# Patient Record
Sex: MALE | Race: WHITE
[De-identification: names, ages, dates, MRNs, and addresses within clinical notes are randomized per-mention and may not be internally consistent; named-entity substitution may affect disease eponyms.]

---

## 2020-03-19 ENCOUNTER — HOSPITAL ENCOUNTER (EMERGENCY)
Dept: HOSPITAL 47 - EC | Age: 30
Discharge: HOME | End: 2020-03-19
Payer: COMMERCIAL

## 2020-03-19 VITALS — HEART RATE: 78 BPM | SYSTOLIC BLOOD PRESSURE: 138 MMHG | DIASTOLIC BLOOD PRESSURE: 76 MMHG | TEMPERATURE: 97.9 F

## 2020-03-19 VITALS — RESPIRATION RATE: 16 BRPM

## 2020-03-19 DIAGNOSIS — M71.162: Primary | ICD-10-CM

## 2020-03-19 DIAGNOSIS — F17.200: ICD-10-CM

## 2020-03-19 DIAGNOSIS — Z88.8: ICD-10-CM

## 2020-03-19 DIAGNOSIS — Z88.0: ICD-10-CM

## 2020-03-19 LAB
ALBUMIN SERPL-MCNC: 4.7 G/DL (ref 3.5–5)
ALP SERPL-CCNC: 90 U/L (ref 38–126)
ALT SERPL-CCNC: 47 U/L (ref 4–49)
ANION GAP SERPL CALC-SCNC: 7 MMOL/L
AST SERPL-CCNC: 31 U/L (ref 17–59)
BASOPHILS # BLD AUTO: 0 K/UL (ref 0–0.2)
BASOPHILS NFR BLD AUTO: 0 %
BUN SERPL-SCNC: 16 MG/DL (ref 9–20)
CALCIUM SPEC-MCNC: 10 MG/DL (ref 8.4–10.2)
CHLORIDE SERPL-SCNC: 105 MMOL/L (ref 98–107)
CO2 SERPL-SCNC: 27 MMOL/L (ref 22–30)
EOSINOPHIL # BLD AUTO: 0.3 K/UL (ref 0–0.7)
EOSINOPHIL NFR BLD AUTO: 2 %
ERYTHROCYTE [DISTWIDTH] IN BLOOD BY AUTOMATED COUNT: 4.17 M/UL (ref 4.3–5.9)
ERYTHROCYTE [DISTWIDTH] IN BLOOD: 12.7 % (ref 11.5–15.5)
GLUCOSE SERPL-MCNC: 97 MG/DL (ref 74–99)
HCT VFR BLD AUTO: 37.7 % (ref 39–53)
HGB BLD-MCNC: 12.3 GM/DL (ref 13–17.5)
LYMPHOCYTES # SPEC AUTO: 2.4 K/UL (ref 1–4.8)
LYMPHOCYTES NFR SPEC AUTO: 15 %
MCH RBC QN AUTO: 29.6 PG (ref 25–35)
MCHC RBC AUTO-ENTMCNC: 32.7 G/DL (ref 31–37)
MCV RBC AUTO: 90.6 FL (ref 80–100)
MONOCYTES # BLD AUTO: 0.6 K/UL (ref 0–1)
MONOCYTES NFR BLD AUTO: 4 %
NEUTROPHILS # BLD AUTO: 12.6 K/UL (ref 1.3–7.7)
NEUTROPHILS NFR BLD AUTO: 79 %
PLATELET # BLD AUTO: 311 K/UL (ref 150–450)
POTASSIUM SERPL-SCNC: 4.5 MMOL/L (ref 3.5–5.1)
PROT SERPL-MCNC: 7.4 G/DL (ref 6.3–8.2)
SODIUM SERPL-SCNC: 139 MMOL/L (ref 137–145)
URATE SERPL-MCNC: 9.2 MG/DL (ref 3.5–8.5)
WBC # BLD AUTO: 16 K/UL (ref 3.8–10.6)

## 2020-03-19 PROCEDURE — 85652 RBC SED RATE AUTOMATED: CPT

## 2020-03-19 PROCEDURE — 85025 COMPLETE CBC W/AUTO DIFF WBC: CPT

## 2020-03-19 PROCEDURE — 80053 COMPREHEN METABOLIC PANEL: CPT

## 2020-03-19 PROCEDURE — 99284 EMERGENCY DEPT VISIT MOD MDM: CPT

## 2020-03-19 PROCEDURE — 36415 COLL VENOUS BLD VENIPUNCTURE: CPT

## 2020-03-19 PROCEDURE — 96374 THER/PROPH/DIAG INJ IV PUSH: CPT

## 2020-03-19 PROCEDURE — 86140 C-REACTIVE PROTEIN: CPT

## 2020-03-19 PROCEDURE — 96375 TX/PRO/DX INJ NEW DRUG ADDON: CPT

## 2020-03-19 PROCEDURE — 84550 ASSAY OF BLOOD/URIC ACID: CPT

## 2020-03-19 PROCEDURE — 73562 X-RAY EXAM OF KNEE 3: CPT

## 2020-03-19 NOTE — XR
EXAMINATION TYPE: XR knee complete LT

 

DATE OF EXAM: 3/19/2020

 

CLINICAL HISTORY: History of gout with pain and swelling.

 

TECHNIQUE:  Three views of the left knee are obtained.

 

COMPARISON: None.

 

FINDINGS:  There is no acute fracture/dislocation evident in left knee.  The tri-compartment joint sp
aces appear within normal limits. Well-defined ovoid 6 mm bony fragment from the anterior inferior as
pect of patella could reflect product of prior trauma is stable. There is new moderate prepatellar an
d superficial infrapatellar subcutaneous edema. 

 

IMPRESSION: As above.

## 2020-03-19 NOTE — ED
General Adult HPI





- General


Chief complaint: Extremity Problem,Nontraumatic


Stated complaint: L Knee Swelling


Time Seen by Provider: 20 12:05


Source: patient


Mode of arrival: ambulatory


Limitations: no limitations





- History of Present Illness


Initial comments: 





The patient is a 30-year-old male with past history of gout who presents to the 

emergency room with reported left knee pain.  He states that the pain started 

last night.  He woke this morning to a very red and swollen left knee.  No 

history of knee pain or trauma in the past.  States that he does have a history 

of gout which normally affects his ankles and toes.  States he last had a bout 1

week ago in his right ankle.  States he is taking colchicine which she does have

prescribed to him.  Does believe the prescription may be .  States that 

this improved.  The patient is concerned that he was, dictating and this is 

because his knee pain.  He denies hardware or previous surgeries in the 

extremity.  No calf pain or swelling.  Denies repetitive bending or kneeling.  

No fevers or chills.  Admits to pain with flexion and extension of the left 

knee.  Has been able to ambulance on it without difficulty.  There are no other 

alleviating, precipitating or modifying factors





- Related Data


                                Home Medications











 Medication  Instructions  Recorded  Confirmed


 


traMADol HCl [Ultram] 50 mg PO Q6H PRN 16








                                  Previous Rx's











 Medication  Instructions  Recorded


 


Ondansetron Odt [Zofran Odt] 4 mg PO Q12HR 5 Days  tab 16


 


Cephalexin [Keflex] 500 mg PO Q6HR #28 cap 20


 


Sulfamethox-Tmp 800-160Mg [Bactrim 2 each PO Q12HR #28 tab 20





Ds]  











                                    Allergies











Allergy/AdvReac Type Severity Reaction Status Date / Time


 


diphenhydramine Allergy  Nausea Verified 20 12:07





[From Benadryl]     


 


Penicillins AdvReac  Itching Verified 16 15:19














Review of Systems


ROS Statement: 


Those systems with pertinent positive or pertinent negative responses have been 

documented in the HPI.





ROS Other: All systems not noted in ROS Statement are negative.





Past Medical History


Past Medical History: No Reported History


Additional Past Medical History / Comment(s): gout


History of Any Multi-Drug Resistant Organisms: None Reported


Additional Past Surgical History / Comment(s): lasix surgery


Past Psychological History: No Psychological Hx Reported


Smoking Status: Current every day smoker


Past Alcohol Use History: None Reported


Past Drug Use History: Marijuana





General Exam


Limitations: no limitations





Course


                                   Vital Signs











  20





  12:02 12:42 14:53


 


Temperature 98.0 F  97.9 F


 


Pulse Rate 104 H  78


 


Respiratory 18 16 16





Rate   


 


Blood Pressure 157/81  138/76


 


O2 Sat by Pulse 98  97





Oximetry   














Medical Decision Making





- Medical Decision Making





Upon arrival the patient is placed in room 27.  A thorough history and physical 

exam was performed.  I did conduct laboratory studies.  With blood cell count is

16.  Uric acid elevated at 9.2.  Reactive protein 12.5.  X-ray of the patient's 

knee demonstrates no acute fracture or dislocation.  Tricompartment joint spaces

appear within normal limits.  Well-defined ovoid 6 linear bony fragment from the

anterior inferior aspect of the patella may represent prior trauma. Moderate 

prepatellar and superficial infrapatellar subcutaneous edema.  I did discuss the

case with [facility].  She is in the emergency room and 

evaluated the patient herself.  She discuss his case with Dr. Borjas.  The 

consensus is a patient has a prepatellar bursitis versus septic bursitis.  The 

patient was given a dose of Rocephin through the IV.  Orthopedics did recommend 

that the patient be discharged home with oral antibiotics.  He is to call and 

make an appointment with the office for further evaluation.  He is to alternate 

doing heat and cold.  I will place the patient on Bactrim and Keflex.  The 

patient understood this.  If he has any new or worsening symptoms she should 

return to the emergency room.  Patient was discharged home in stable condition





- Lab Data


Result diagrams: 


                                 20 12:35





                                 20 12:35


                                   Lab Results











  20 Range/Units





  12:35 12:35 


 


WBC  16.0 H   (3.8-10.6)  k/uL


 


RBC  4.17 L   (4.30-5.90)  m/uL


 


Hgb  12.3 L   (13.0-17.5)  gm/dL


 


Hct  37.7 L   (39.0-53.0)  %


 


MCV  90.6   (80.0-100.0)  fL


 


MCH  29.6   (25.0-35.0)  pg


 


MCHC  32.7   (31.0-37.0)  g/dL


 


RDW  12.7   (11.5-15.5)  %


 


Plt Count  311   (150-450)  k/uL


 


Neutrophils %  79   %


 


Lymphocytes %  15   %


 


Monocytes %  4   %


 


Eosinophils %  2   %


 


Basophils %  0   %


 


Neutrophils #  12.6 H   (1.3-7.7)  k/uL


 


Lymphocytes #  2.4   (1.0-4.8)  k/uL


 


Monocytes #  0.6   (0-1.0)  k/uL


 


Eosinophils #  0.3   (0-0.7)  k/uL


 


Basophils #  0.0   (0-0.2)  k/uL


 


ESR  8   (0-15)  mm/hr


 


Sodium   139  (137-145)  mmol/L


 


Potassium   4.5  (3.5-5.1)  mmol/L


 


Chloride   105  ()  mmol/L


 


Carbon Dioxide   27  (22-30)  mmol/L


 


Anion Gap   7  mmol/L


 


BUN   16  (9-20)  mg/dL


 


Creatinine   0.98  (0.66-1.25)  mg/dL


 


Est GFR (CKD-EPI)AfAm   >90  (>60 ml/min/1.73 sqM)  


 


Est GFR (CKD-EPI)NonAf   >90  (>60 ml/min/1.73 sqM)  


 


Glucose   97  (74-99)  mg/dL


 


Uric Acid   9.2 H  (3.5-8.5)  mg/dL


 


Calcium   10.0  (8.4-10.2)  mg/dL


 


Total Bilirubin   0.3  (0.2-1.3)  mg/dL


 


AST   31  (17-59)  U/L


 


ALT   47  (4-49)  U/L


 


Alkaline Phosphatase   90  ()  U/L


 


C-Reactive Protein   12.5 H  (<10.0)  mg/L


 


Total Protein   7.4  (6.3-8.2)  g/dL


 


Albumin   4.7  (3.5-5.0)  g/dL














Disposition


Clinical Impression: 


 Septic prepatellar bursitis of left knee, Knee pain





Disposition: HOME SELF-CARE


Condition: Stable


Instructions (If sedation given, give patient instructions):  Knee Bursitis (ED)


Additional Instructions: 


Please call to make an appointment with Dr. Borjas in 2-4 days.  Return to 

the emergency room for any new or worsening symptoms


Prescriptions: 


Sulfamethox-Tmp 800-160Mg [Bactrim Ds] 2 each PO Q12HR #28 tab


Cephalexin [Keflex] 500 mg PO Q6HR #28 cap


Is patient prescribed a controlled substance at d/c from ED?: No


Referrals: 


Nonstaff,Physician [Primary Care Provider] - 1-2 days


Time of Disposition: 14:56

## 2020-08-05 ENCOUNTER — HOSPITAL ENCOUNTER (EMERGENCY)
Dept: HOSPITAL 47 - EC | Age: 30
Discharge: HOME | End: 2020-08-05
Payer: OTHER GOVERNMENT

## 2020-08-05 VITALS — HEART RATE: 62 BPM | SYSTOLIC BLOOD PRESSURE: 143 MMHG | TEMPERATURE: 98.6 F | DIASTOLIC BLOOD PRESSURE: 90 MMHG

## 2020-08-05 VITALS — RESPIRATION RATE: 18 BRPM

## 2020-08-05 DIAGNOSIS — H53.8: ICD-10-CM

## 2020-08-05 DIAGNOSIS — T31.0: ICD-10-CM

## 2020-08-05 DIAGNOSIS — T20.10XA: Primary | ICD-10-CM

## 2020-08-05 DIAGNOSIS — F17.200: ICD-10-CM

## 2020-08-05 DIAGNOSIS — T22.111A: ICD-10-CM

## 2020-08-05 DIAGNOSIS — Z88.0: ICD-10-CM

## 2020-08-05 DIAGNOSIS — T23.231A: ICD-10-CM

## 2020-08-05 DIAGNOSIS — Z88.8: ICD-10-CM
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
## 2020-08-05 PROCEDURE — 90471 IMMUNIZATION ADMIN: CPT

## 2020-08-05 PROCEDURE — 99283 EMERGENCY DEPT VISIT LOW MDM: CPT

## 2020-08-05 PROCEDURE — 90715 TDAP VACCINE 7 YRS/> IM: CPT
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
## 2020-08-09 ENCOUNTER — HOSPITAL ENCOUNTER (EMERGENCY)
Dept: HOSPITAL 47 - EC | Age: 30
Discharge: HOME | End: 2020-08-09
Payer: OTHER GOVERNMENT

## 2020-08-09 VITALS
RESPIRATION RATE: 18 BRPM | SYSTOLIC BLOOD PRESSURE: 133 MMHG | DIASTOLIC BLOOD PRESSURE: 72 MMHG | HEART RATE: 97 BPM | TEMPERATURE: 99 F

## 2020-08-09 DIAGNOSIS — Z88.6: ICD-10-CM
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
## 2020-08-09 DIAGNOSIS — Z88.0: ICD-10-CM

## 2020-08-09 DIAGNOSIS — T23.001D: Primary | ICD-10-CM

## 2020-08-09 DIAGNOSIS — F17.200: ICD-10-CM

## 2020-08-09 PROCEDURE — 99283 EMERGENCY DEPT VISIT LOW MDM: CPT
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
## 2021-05-15 ENCOUNTER — HOSPITAL ENCOUNTER (EMERGENCY)
Dept: HOSPITAL 47 - EC | Age: 31
LOS: 1 days | Discharge: HOME | End: 2021-05-16
Payer: OTHER GOVERNMENT

## 2021-05-15 VITALS — TEMPERATURE: 98 F | RESPIRATION RATE: 18 BRPM

## 2021-05-15 DIAGNOSIS — R07.89: Primary | ICD-10-CM

## 2021-05-15 DIAGNOSIS — F12.90: ICD-10-CM

## 2021-05-15 DIAGNOSIS — Z90.09: ICD-10-CM

## 2021-05-15 DIAGNOSIS — F17.200: ICD-10-CM

## 2021-05-15 LAB
ALBUMIN SERPL-MCNC: 4.2 G/DL (ref 3.5–5)
ALP SERPL-CCNC: 64 U/L (ref 38–126)
ALT SERPL-CCNC: 49 U/L (ref 4–49)
ANION GAP SERPL CALC-SCNC: 8 MMOL/L
APTT BLD: 23.9 SEC (ref 22–30)
AST SERPL-CCNC: 31 U/L (ref 17–59)
BASOPHILS # BLD AUTO: 0.1 K/UL (ref 0–0.2)
BASOPHILS NFR BLD AUTO: 1 %
BUN SERPL-SCNC: 18 MG/DL (ref 9–20)
CALCIUM SPEC-MCNC: 10.5 MG/DL (ref 8.4–10.2)
CHLORIDE SERPL-SCNC: 106 MMOL/L (ref 98–107)
CO2 SERPL-SCNC: 26 MMOL/L (ref 22–30)
D DIMER PPP FEU-MCNC: <0.17 MG/L FEU (ref ?–0.6)
EOSINOPHIL # BLD AUTO: 0.4 K/UL (ref 0–0.7)
EOSINOPHIL NFR BLD AUTO: 5 %
ERYTHROCYTE [DISTWIDTH] IN BLOOD BY AUTOMATED COUNT: 4.39 M/UL (ref 4.3–5.9)
ERYTHROCYTE [DISTWIDTH] IN BLOOD: 12.7 % (ref 11.5–15.5)
GLUCOSE SERPL-MCNC: 110 MG/DL (ref 74–99)
HCT VFR BLD AUTO: 38.6 % (ref 39–53)
HGB BLD-MCNC: 13.5 GM/DL (ref 13–17.5)
INR PPP: 0.9 (ref ?–1.2)
LYMPHOCYTES # SPEC AUTO: 3.1 K/UL (ref 1–4.8)
LYMPHOCYTES NFR SPEC AUTO: 32 %
MAGNESIUM SPEC-SCNC: 1.8 MG/DL (ref 1.6–2.3)
MCH RBC QN AUTO: 30.8 PG (ref 25–35)
MCHC RBC AUTO-ENTMCNC: 35 G/DL (ref 31–37)
MCV RBC AUTO: 88 FL (ref 80–100)
MONOCYTES # BLD AUTO: 0.4 K/UL (ref 0–1)
MONOCYTES NFR BLD AUTO: 4 %
NEUTROPHILS # BLD AUTO: 5.6 K/UL (ref 1.3–7.7)
NEUTROPHILS NFR BLD AUTO: 58 %
PLATELET # BLD AUTO: 293 K/UL (ref 150–450)
POTASSIUM SERPL-SCNC: 4.1 MMOL/L (ref 3.5–5.1)
PROT SERPL-MCNC: 6.6 G/DL (ref 6.3–8.2)
PT BLD: 10 SEC (ref 9–12)
SODIUM SERPL-SCNC: 140 MMOL/L (ref 137–145)
WBC # BLD AUTO: 9.7 K/UL (ref 3.8–10.6)

## 2021-05-15 PROCEDURE — 99285 EMERGENCY DEPT VISIT HI MDM: CPT

## 2021-05-15 PROCEDURE — 93005 ELECTROCARDIOGRAM TRACING: CPT

## 2021-05-15 PROCEDURE — 80053 COMPREHEN METABOLIC PANEL: CPT

## 2021-05-15 PROCEDURE — 84484 ASSAY OF TROPONIN QUANT: CPT

## 2021-05-15 PROCEDURE — 85025 COMPLETE CBC W/AUTO DIFF WBC: CPT

## 2021-05-15 PROCEDURE — 83735 ASSAY OF MAGNESIUM: CPT

## 2021-05-15 PROCEDURE — 71046 X-RAY EXAM CHEST 2 VIEWS: CPT

## 2021-05-15 PROCEDURE — 36415 COLL VENOUS BLD VENIPUNCTURE: CPT

## 2021-05-15 PROCEDURE — 85610 PROTHROMBIN TIME: CPT

## 2021-05-15 PROCEDURE — 85379 FIBRIN DEGRADATION QUANT: CPT

## 2021-05-15 PROCEDURE — 85730 THROMBOPLASTIN TIME PARTIAL: CPT

## 2021-05-15 NOTE — ED
Chest Pain HPI





- General


Chief Complaint: Chest Pain


Stated Complaint: Chest pain


Time Seen by Provider: 05/15/21 20:21


Source: patient


Mode of arrival: ambulatory


Limitations: no limitations





- History of Present Illness


Initial Comments: 


31-year-old male presenting to the ER today for chief complaint of chest 

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 

he felt a aching pain in his left side of chest. pt states it radiated down the 

left arm an dinto the back slightly, a full sensation. Denies ripping tearing 

pain. pt states that the pain is starting to feel better but it scared him. pt 

denies hx of DM, HTN HLD. Pt is a current everyday smoker. Pt states he did feel

like headed at the time of onset but no dyspnea. pt denies vomiting-had some 

nausea. pt denies leg swelling, hemoptysis, hx of DVT/PE or pleuritic chest 

pain. Patietn denies family history of premature CAD. Patient on arrival appears

well nontoxic in no aute distress. no acute EKG findings.








- Related Data


                                Home Medications











 Medication  Instructions  Recorded  Confirmed


 


Cyanocobalamin (Vitamin B-12) 1,000 mcg PO DAILY 05/15/21 05/15/21





[Vitamin B-12]   


 


Krill Oil 500 mg PO DAILY 05/15/21 05/15/21











                                    Allergies











Allergy/AdvReac Type Severity Reaction Status Date / Time


 


diphenhydramine Allergy  Nausea Verified 05/15/21 20:45





[From Benadryl]     


 


Penicillins AdvReac  Itching Verified 05/15/21 20:45














Review of Systems


ROS Statement: 


Those systems with pertinent positive or pertinent negative responses have been 

documented in the HPI.





ROS Other: All systems not noted in ROS Statement are negative.





Past Medical History


Past Medical History: No Reported History


Additional Past Medical History / Comment(s): gout


History of Any Multi-Drug Resistant Organisms: None Reported


Past Surgical History: Appendectomy


Additional Past Surgical History / Comment(s): lasix surgery


Past Psychological History: PTSD


Smoking Status: Current every day smoker


Past Alcohol Use History: None Reported


Past Drug Use History: Marijuana





General Exam





- General Exam Comments


Initial Comments: 


General:  The patient is awake and alert, in no distress, and does not appear 

acutely ill. 


Eye:  Pupils are equal, round and reactive to light, extra-ocular movements are 

intact.  No nystagmus.  There is normal conjunctiva bilaterally.  No signs of 

icterus.  


Ears, nose, mouth and throat:  There are moist mucous membranes and no oral 

lesions. 


Neck:  The neck is supple, there is no tenderness or JVD.  


Cardiovascular:  There is a regular rate and rhythm. No murmur, rub or gallop is

appreciated.


Respiratory:  Lungs are clear to auscultation, respirations are non-labored, 

breath sounds are equal.  No wheezes, stridor, rales, or rhonchi.


Gastrointestinal:  Soft, non-distended, non-tender abdomen without masses or 

organomegaly noted. There is no rebound or guarding present. 


Musculoskeletal:  Normal ROM, no tenderness.  Strength 5/5. Sensation intact. 

Radial pulses equal bilaterally 2+.  


Neurological:  A&O x 3. CN II-XII intact grossly, There are no obvious motor or 

sensory deficits. Coordination appears grossly intact. Speech is normal.


Skin:  Skin is warm and dry and no rashes or lesions are noted. 


Psychiatric:  Cooperative, appropriate mood & affect, normal judgment.  





Limitations: no limitations





Course


                                   Vital Signs











  05/15/21





  20:08


 


Temperature 98.0 F


 


Pulse Rate 102 H


 


Respiratory 18





Rate 


 


Blood Pressure 143/83


 


O2 Sat by Pulse 97





Oximetry 














Chest Pain MDM





- MDM


31-year-old every day smoker presenting for chest discomfort. resolved on re-

evaluation. pt appears well nontoxic. low heart score. troponin (-) x2. pt cxr 

and lungs clear. dimer (-). pt will  be discharged with pcp f/u recommend 

outpatient stress testing return for worsening symptoms/recurrent





Ventricular rate 96 bpm, FL interval 128 ms, QRS duration 90 ms, QT//421 

ms normal sinus no S elevation or depression appreciated.








Disposition


Clinical Impression: 


 Chest discomfort





Disposition: HOME SELF-CARE


Condition: Good


Instructions (If sedation given, give patient instructions):  Chest Pain (ED)


Additional Instructions: 


Please use medication as discussed.  Please follow-up with family doctor in the 

next 2 days. Please return to emergency room if the symptoms increase or worsen 

or for any other concerns.


Is patient prescribed a controlled substance at d/c from ED?: No


Referrals: 


None,Stated [Primary Care Provider] - 1-2 days


Time of Disposition: 00:23

## 2021-05-15 NOTE — XR
EXAMINATION TYPE: XR chest 2V

 

DATE OF EXAM: 5/15/2021

 

COMPARISON: NONE

 

HISTORY: Pain

 

TECHNIQUE: 2 views

 

FINDINGS: Heart and mediastinum are normal. Lungs are clear. Diaphragm is normal. Bony thorax is inta
ct. The pulmonary vascularity is normal.

 

IMPRESSION: Normal chest.

## 2021-05-16 VITALS — DIASTOLIC BLOOD PRESSURE: 86 MMHG | SYSTOLIC BLOOD PRESSURE: 132 MMHG | HEART RATE: 92 BPM

## 2022-01-24 ENCOUNTER — HOSPITAL ENCOUNTER (EMERGENCY)
Dept: HOSPITAL 47 - EC | Age: 32
Discharge: HOME | End: 2022-01-24
Payer: OTHER GOVERNMENT

## 2022-01-24 VITALS — HEART RATE: 78 BPM | RESPIRATION RATE: 16 BRPM

## 2022-01-24 VITALS — DIASTOLIC BLOOD PRESSURE: 84 MMHG | SYSTOLIC BLOOD PRESSURE: 141 MMHG | TEMPERATURE: 97.8 F

## 2022-01-24 DIAGNOSIS — Z88.1: ICD-10-CM

## 2022-01-24 DIAGNOSIS — Z88.0: ICD-10-CM

## 2022-01-24 DIAGNOSIS — M70.42: Primary | ICD-10-CM

## 2022-01-24 DIAGNOSIS — F17.200: ICD-10-CM

## 2022-01-24 LAB
ALBUMIN SERPL-MCNC: 4.4 G/DL (ref 3.5–5)
ALP SERPL-CCNC: 75 U/L (ref 38–126)
ALT SERPL-CCNC: 34 U/L (ref 4–49)
ANION GAP SERPL CALC-SCNC: 10 MMOL/L
APTT BLD: 24.3 SEC (ref 22–30)
AST SERPL-CCNC: 26 U/L (ref 17–59)
BASOPHILS # BLD AUTO: 0 K/UL (ref 0–0.2)
BASOPHILS NFR BLD AUTO: 0 %
BUN SERPL-SCNC: 11 MG/DL (ref 9–20)
CALCIUM SPEC-MCNC: 9.8 MG/DL (ref 8.4–10.2)
CHLORIDE SERPL-SCNC: 105 MMOL/L (ref 98–107)
CO2 SERPL-SCNC: 25 MMOL/L (ref 22–30)
EOSINOPHIL # BLD AUTO: 0.2 K/UL (ref 0–0.7)
EOSINOPHIL NFR BLD AUTO: 2 %
ERYTHROCYTE [DISTWIDTH] IN BLOOD BY AUTOMATED COUNT: 4.45 M/UL (ref 4.3–5.9)
ERYTHROCYTE [DISTWIDTH] IN BLOOD: 13.2 % (ref 11.5–15.5)
GLUCOSE SERPL-MCNC: 94 MG/DL (ref 74–99)
HCT VFR BLD AUTO: 41.1 % (ref 39–53)
HGB BLD-MCNC: 13.7 GM/DL (ref 13–17.5)
INR PPP: 0.9 (ref ?–1.2)
LYMPHOCYTES # SPEC AUTO: 2.7 K/UL (ref 1–4.8)
LYMPHOCYTES NFR SPEC AUTO: 27 %
MCH RBC QN AUTO: 30.7 PG (ref 25–35)
MCHC RBC AUTO-ENTMCNC: 33.2 G/DL (ref 31–37)
MCV RBC AUTO: 92.4 FL (ref 80–100)
MONOCYTES # BLD AUTO: 0.3 K/UL (ref 0–1)
MONOCYTES NFR BLD AUTO: 3 %
NEUTROPHILS # BLD AUTO: 6.7 K/UL (ref 1.3–7.7)
NEUTROPHILS NFR BLD AUTO: 66 %
PLATELET # BLD AUTO: 299 K/UL (ref 150–450)
POTASSIUM SERPL-SCNC: 4.5 MMOL/L (ref 3.5–5.1)
PROT SERPL-MCNC: 7.1 G/DL (ref 6.3–8.2)
PT BLD: 9.8 SEC (ref 9–12)
SODIUM SERPL-SCNC: 140 MMOL/L (ref 137–145)
WBC # BLD AUTO: 10 K/UL (ref 3.8–10.6)

## 2022-01-24 PROCEDURE — 99283 EMERGENCY DEPT VISIT LOW MDM: CPT

## 2022-01-24 PROCEDURE — 73562 X-RAY EXAM OF KNEE 3: CPT

## 2022-01-24 PROCEDURE — 85610 PROTHROMBIN TIME: CPT

## 2022-01-24 PROCEDURE — 86140 C-REACTIVE PROTEIN: CPT

## 2022-01-24 PROCEDURE — 85652 RBC SED RATE AUTOMATED: CPT

## 2022-01-24 PROCEDURE — 83605 ASSAY OF LACTIC ACID: CPT

## 2022-01-24 PROCEDURE — 85730 THROMBOPLASTIN TIME PARTIAL: CPT

## 2022-01-24 PROCEDURE — 36415 COLL VENOUS BLD VENIPUNCTURE: CPT

## 2022-01-24 PROCEDURE — 87040 BLOOD CULTURE FOR BACTERIA: CPT

## 2022-01-24 PROCEDURE — 96374 THER/PROPH/DIAG INJ IV PUSH: CPT

## 2022-01-24 PROCEDURE — 80053 COMPREHEN METABOLIC PANEL: CPT

## 2022-01-24 PROCEDURE — 85025 COMPLETE CBC W/AUTO DIFF WBC: CPT

## 2022-01-24 PROCEDURE — 96375 TX/PRO/DX INJ NEW DRUG ADDON: CPT

## 2022-01-24 PROCEDURE — 84550 ASSAY OF BLOOD/URIC ACID: CPT

## 2022-01-24 NOTE — XR
EXAMINATION TYPE: XR knee complete LT

 

DATE OF EXAM: 1/24/2022

 

CLINICAL HISTORY: Pain and swelling for one week.

 

TECHNIQUE:  Three views of the left knee are obtained.

 

COMPARISON: Prior left knee x-ray March 19, 2020.

 

FINDINGS:  There is no acute fracture/dislocation evident in left knee.  The tri-compartment joint sp
aces remain within normal limits. Stable well-defined 6 mm ossific fragment overlying the anterior-in
ferior aspect of the patella could reflect product of old trauma. Moderate prepatellar and superficia
l infrapatellar subcutaneous edema is redemonstrated measuring up to 2.5 cm in thickness. Correlate f
or recurrent bursitis.

 

IMPRESSION: As above

## 2022-03-22 ENCOUNTER — HOSPITAL ENCOUNTER (EMERGENCY)
Dept: HOSPITAL 47 - EC | Age: 32
LOS: 1 days | Discharge: HOME | End: 2022-03-23
Payer: OTHER GOVERNMENT

## 2022-03-22 VITALS — TEMPERATURE: 98.3 F

## 2022-03-22 DIAGNOSIS — F17.200: ICD-10-CM

## 2022-03-22 DIAGNOSIS — Z88.0: ICD-10-CM

## 2022-03-22 DIAGNOSIS — Z88.8: ICD-10-CM

## 2022-03-22 DIAGNOSIS — M10.9: Primary | ICD-10-CM

## 2022-03-22 PROCEDURE — 99283 EMERGENCY DEPT VISIT LOW MDM: CPT

## 2022-03-23 VITALS — SYSTOLIC BLOOD PRESSURE: 132 MMHG | DIASTOLIC BLOOD PRESSURE: 87 MMHG | RESPIRATION RATE: 18 BRPM | HEART RATE: 71 BPM

## 2022-03-23 NOTE — ED
General Adult HPI





- General


Chief complaint: Extremity Problem,Nontraumatic


Stated complaint: Gout, fever


Time Seen by Provider: 22 00:22


Source: patient


Mode of arrival: ambulatory


Limitations: no limitations





- History of Present Illness


Initial comments: 





This patient is a 32-year-old man with procedures history of gout, who states 

that he feels he is having a flareup of this.  Patient states that he started 

having symptoms to the right foot yesterday.  He has had increasing pain and 

swelling as well as some redness.  The patient notes that foot is tender to 

movement or palpation.  He states is identical to previous gout flare up.


Onset/Timin


-: days(s)


Location: right, lower extremity


Radiation: non-radiation


Quality: aching


Consistency: constant


Improves with: none


Worsens with: movement


Associated Symptoms: denies other symptoms


Treatments Prior to Arrival: none





- Related Data


                                  Previous Rx's











 Medication  Instructions  Recorded


 


Cephalexin [Keflex] 500 mg PO Q6HR 10 Days #40 cap 22


 


Indomethacin [Indocin] 50 mg PO TID PRN #15 capsule 22


 


Colchicine 0.6 mg PO Q1H PRN #12 capsule 22


 


Indomethacin [Indocin] 50 mg PO TID #12 capsule 22


 


predniSONE 60 mg PO DAILY #30 tab 22











                                    Allergies











Allergy/AdvReac Type Severity Reaction Status Date / Time


 


Penicillins Allergy  Anaphylaxis Verified 22 22:52


 


diphenhydramine AdvReac  Jittery Verified 22 22:52





[From Benadryl]     














Review of Systems


ROS Statement: 


Those systems with pertinent positive or pertinent negative responses have been 

documented in the HPI.





ROS Other: All systems not noted in ROS Statement are negative.


Constitutional: Denies: fever, chills


Respiratory: Denies: cough, dyspnea


Cardiovascular: Denies: chest pain, palpitations, edema


Gastrointestinal: Denies: abdominal pain, vomiting, diarrhea


Genitourinary: Denies: dysuria


Musculoskeletal: Reports: as per HPI, joint swelling, arthralgia


Skin: Denies: rash


Neurological: Denies: headache, weakness, numbness





Past Medical History


Past Medical History: No Reported History


Additional Past Medical History / Comment(s): gout


History of Any Multi-Drug Resistant Organisms: None Reported


Past Surgical History: Appendectomy


Additional Past Surgical History / Comment(s): lasix surgery


Past Psychological History: PTSD


Smoking Status: Current every day smoker


Past Alcohol Use History: None Reported


Past Drug Use History: Marijuana





General Exam


Limitations: no limitations


General appearance: alert, in no apparent distress


Head exam: Present: atraumatic, normocephalic


Eye exam: Present: normal appearance.  Absent: scleral icterus, conjunctival 

injection


Respiratory exam: Present: normal lung sounds bilaterally.  Absent: respiratory 

distress, wheezes, rales, rhonchi, stridor


Cardiovascular Exam: Present: regular rate, normal rhythm, normal heart sounds. 

Absent: systolic murmur, diastolic murmur, rubs, gallop


GI/Abdominal exam: Present: soft.  Absent: distended, tenderness, guarding, 

rebound, rigid


  ** Right


Knee exam: Present: normal inspection, full ROM.  Absent: tenderness, swelling


Lower Leg exam: Present: normal inspection, full ROM.  Absent: tenderness, 

swelling


Ankle exam: Present: normal inspection, full ROM.  Absent: tenderness, swelling


Foot/Toe exam: Present: tenderness, swelling, erythema


Neurovascular tendon exam: Present: no vascular compromise.  Absent: pulse 

deficit, abnormal cap refill, motor deficit, sensory deficit, tendon deficit, 

extremity cold to touch, pallor





Course


                                   Vital Signs











  22





  22:50


 


Temperature 98.3 F


 


Pulse Rate 107 H


 


Respiratory 20





Rate 


 


Blood Pressure 130/76


 


O2 Sat by Pulse 98





Oximetry 














Disposition


Clinical Impression: 


 Gout





Disposition: HOME SELF-CARE


Condition: Good


Instructions (If sedation given, give patient instructions):  Low Purine Diet 

(ED), Gout (ED)


Prescriptions: 


Colchicine 0.6 mg PO Q1H PRN #12 capsule


 PRN Reason: Pain


Indomethacin [Indocin] 50 mg PO TID #12 capsule


predniSONE 60 mg PO DAILY #30 tab


Is patient prescribed a controlled substance at d/c from ED?: No


Referrals: 


None,Stated [Primary Care Provider] - 1-2 days

## 2022-10-09 ENCOUNTER — HOSPITAL ENCOUNTER (EMERGENCY)
Dept: HOSPITAL 47 - EC | Age: 32
Discharge: HOME | End: 2022-10-09
Payer: OTHER GOVERNMENT

## 2022-10-09 VITALS
TEMPERATURE: 97.8 F | DIASTOLIC BLOOD PRESSURE: 68 MMHG | RESPIRATION RATE: 20 BRPM | SYSTOLIC BLOOD PRESSURE: 120 MMHG | HEART RATE: 105 BPM

## 2022-10-09 DIAGNOSIS — F17.200: ICD-10-CM

## 2022-10-09 DIAGNOSIS — Z88.0: ICD-10-CM

## 2022-10-09 DIAGNOSIS — Z88.8: ICD-10-CM

## 2022-10-09 DIAGNOSIS — M70.52: Primary | ICD-10-CM

## 2022-10-09 PROCEDURE — 99283 EMERGENCY DEPT VISIT LOW MDM: CPT

## 2022-10-09 NOTE — ED
General Adult HPI





- General


Chief complaint: Extremity Problem,Nontraumatic


Stated complaint: Knee infection


Time Seen by Provider: 10/09/22 07:35


Source: patient, RN notes reviewed, old records reviewed


Mode of arrival: ambulatory


Limitations: no limitations





- History of Present Illness


Initial comments: 





This is a 32-year-old male who presents emergency Department complaining that 

his left knee hurts.  Patient states he in the past has had a bursa infection 

and in fact just like this.  Patient notices a little swelling on top of his 

injury.  Patella region.  And patient believes is tender and warm.  Patient 

denies any injury but he states he is up and down.  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

a lot of tattooing.  Patient denies any fever chills.  Patient denies any red 

streaking.  Patient states this started last time and then he got considerably 

worse.  Patient does have full range of motion of his knee.  Patient has no 

other complaints.





- Related Data


                                  Previous Rx's











 Medication  Instructions  Recorded


 


Cephalexin [Keflex] 500 mg PO Q6HR 10 Days #40 cap 01/24/22


 


Indomethacin [Indocin] 50 mg PO TID PRN #15 capsule 01/24/22


 


Colchicine 0.6 mg PO Q1H PRN #12 capsule 03/23/22


 


Indomethacin [Indocin] 50 mg PO TID #12 capsule 03/23/22


 


predniSONE 60 mg PO DAILY #30 tab 03/23/22


 


Ibuprofen [Motrin] 600 mg PO Q6HR PRN #20 tab 10/09/22


 


Sulfamethox-Tmp 800-160Mg [Bactrim 1 each PO Q12HR #20 tab 10/09/22





-160 mg]  











                                    Allergies











Allergy/AdvReac Type Severity Reaction Status Date / Time


 


Penicillins Allergy  Anaphylaxis Verified 10/09/22 07:34


 


diphenhydramine AdvReac  Jittery Verified 10/09/22 07:34





[From Benadryl]     














Review of Systems


ROS Statement: 


Those systems with pertinent positive or pertinent negative responses have been 

documented in the HPI.





ROS Other: All systems not noted in ROS Statement are negative.





Past Medical History


Past Medical History: No Reported History


Additional Past Medical History / Comment(s): gout


History of Any Multi-Drug Resistant Organisms: None Reported


Past Surgical History: Appendectomy


Additional Past Surgical History / Comment(s): lasix surgery


Past Psychological History: PTSD


Smoking Status: Current every day smoker


Past Alcohol Use History: None Reported


Past Drug Use History: Marijuana





General Exam





- General Exam Comments


Initial Comments: 





GENERAL 


Patient is well-developed and well-nourished.  Patient is in mild distress.





EYES


Patient's pupils are equal and round.  Extraocular motion is intact





SKIN


Unremarkable





NEURO


The patient is alert and oriented 3





PYSCH


Patient has normal interpersonal interactions.





MUSCULOSKELETAL


Patient's left knee has some swelling over the patella and is tender to palpate 

the area is not erythematous.  Patient has no ligamentous laxity.  Patient's 

full range of motion of the knee.


Limitations: no limitations





Course





                                   Vital Signs











  10/09/22





  07:31


 


Temperature 97.8 F


 


Pulse Rate 105 H


 


Respiratory 20





Rate 


 


Blood Pressure 120/68


 


O2 Sat by Pulse 97





Oximetry 














Disposition


Clinical Impression: 


 Suprapatellar bursitis of left knee





Disposition: HOME SELF-CARE


Condition: Good


Instructions (If sedation given, give patient instructions):  Knee Bursitis (ED)


Prescriptions: 


Sulfamethox-Tmp 800-160Mg [Bactrim -160 mg] 1 each PO Q12HR #20 tab


Ibuprofen [Motrin] 600 mg PO Q6HR PRN #20 tab


 PRN Reason: For pain   


Is patient prescribed a controlled substance at d/c from ED?: No


Referrals: 


None,Stated [Primary Care Provider] - 1-2 days


Time of Disposition: 07:55

## 2024-07-30 NOTE — ED
General Adult HPI





- General


Chief complaint: Extremity Problem,Nontraumatic


Stated complaint: lt knee pain/swelling


Time Seen by Provider: 01/24/22 12:41


Source: patient, family


Mode of arrival: ambulatory





- History of Present Illness


Initial comments: 





31-year-old male with a past medical history of gout presents to the emergency 

room for left knee pain.  Patient has had left knee pain since a week ago, or 

Monday.  On Wednesday patient started to have swelling of the left knee.  On 

Thursday he went to the urgent care and he was told he probably has psoriatic 

arthritis.  He tried to follow up with Dr. Levy's office as he was told he 

needs to see a rheumatologist but they were not able to see him as they do not 

take his insurance.  Patient states that it has gotten worse and is painful to 

bend and walk on now.  He denies fevers but did feel sweaty last night.  He darrick

es injury.Patient has no other complaints at this time including shortness of 

breath, chest pain, abdominal pain, nausea or vomiting, headache, or visual 

changes.





- Related Data


                                  Previous Rx's











 Medication  Instructions  Recorded


 


Cephalexin [Keflex] 500 mg PO Q6HR 10 Days #40 cap 01/24/22


 


Indomethacin [Indocin] 50 mg PO TID PRN #15 capsule 01/24/22











                                    Allergies











Allergy/AdvReac Type Severity Reaction Status Date / Time


 


Penicillins Allergy  Anaphylaxis Verified 01/24/22 14:00


 


diphenhydramine AdvReac  Jittery Verified 01/24/22 14:00





[From Benadryl]     














Review of Systems


ROS Statement: 


Those systems with pertinent positive or pertinent negative responses have been 

documented in the HPI.





ROS Other: All systems not noted in ROS Statement are negative.





Past Medical History


Past Medical History: No Reported History


Additional Past Medical History / Comment(s): gout


History of Any Multi-Drug Resistant Organisms: None Reported


Past Surgical History: Appendectomy


Additional Past Surgical History / Comment(s): lasix surgery


Past Psychological History: PTSD


Smoking Status: Current every day smoker


Past Alcohol Use History: None Reported


Past Drug Use History: Marijuana





General Exam


General appearance: alert, in no apparent distress


Head exam: Present: atraumatic


Eye exam: Present: normal appearance, PERRL, EOMI.  Absent: scleral icterus, 

conjunctival injection


ENT exam: Present: normal exam, mucous membranes moist


Neck exam: Present: normal inspection, full ROM.  Absent: tenderness


Respiratory exam: Present: normal lung sounds bilaterally.  Absent: respiratory 

distress, wheezes


Cardiovascular Exam: Present: regular rate, normal rhythm, normal heart sounds


Extremities exam: Present: tenderness (Generalized tenderness of the anterior 

left knee.), normal capillary refill (Capillary refill less than 2 seconds, 

pedal 2+ left lower extremity.), joint swelling (Mild edema of the left knee.  

Mild erythema associated as well.).  Absent: full ROM (30 flexion of the left 

knee, extension to neutral position.)





Course


                                   Vital Signs











  01/24/22





  12:13


 


Temperature 97.8 F


 


Pulse Rate 111 H


 


Respiratory 20





Rate 


 


Blood Pressure 141/84


 


O2 Sat by Pulse 97





Oximetry 














Medical Decision Making





- Medical Decision Making





Vitals are stable.  Patient afebrile 3.  CBC is normal.  White count is 10.  

CMP unremarkable.  CRP slightly elevated 2.4.  ESR normal.  Uric acid 10.3.  X-

ray shows moderate prepatellar and superficial infrapatellar subcutaneous edema,

correlate for recurrent bursitis.  This is consistent with clinical 

presentation.  At this time low concern for subarachnoid is given patient has 

had symptoms for over a week now and labs are not unusual.  He is afebrile.  At 

this time I spoke with [facility] who is on-call orthopedics.  Recommend 

starting him on cefazolin as well as gout medication.  Patient will follow-up 

with them this week.  He will return here for any worsening symptoms.





- Lab Data


Result diagrams: 


                                 01/24/22 13:28





                                 01/24/22 13:28


                                   Lab Results











  01/24/22 01/24/22 01/24/22 Range/Units





  13:28 13:28 13:28 


 


WBC  10.0    (3.8-10.6)  k/uL


 


RBC  4.45    (4.30-5.90)  m/uL


 


Hgb  13.7    (13.0-17.5)  gm/dL


 


Hct  41.1    (39.0-53.0)  %


 


MCV  92.4    (80.0-100.0)  fL


 


MCH  30.7    (25.0-35.0)  pg


 


MCHC  33.2    (31.0-37.0)  g/dL


 


RDW  13.2    (11.5-15.5)  %


 


Plt Count  299    (150-450)  k/uL


 


MPV  8.0    


 


Neutrophils %  66    %


 


Lymphocytes %  27    %


 


Monocytes %  3    %


 


Eosinophils %  2    %


 


Basophils %  0    %


 


Neutrophils #  6.7    (1.3-7.7)  k/uL


 


Lymphocytes #  2.7    (1.0-4.8)  k/uL


 


Monocytes #  0.3    (0-1.0)  k/uL


 


Eosinophils #  0.2    (0-0.7)  k/uL


 


Basophils #  0.0    (0-0.2)  k/uL


 


ESR  8    (0-15)  mm/hr


 


PT   9.8   (9.0-12.0)  sec


 


INR   0.9   (<1.2)  


 


APTT   24.3   (22.0-30.0)  sec


 


Sodium    140  (137-145)  mmol/L


 


Potassium    4.5  (3.5-5.1)  mmol/L


 


Chloride    105  ()  mmol/L


 


Carbon Dioxide    25  (22-30)  mmol/L


 


Anion Gap    10  mmol/L


 


BUN    11  (9-20)  mg/dL


 


Creatinine    1.11  (0.66-1.25)  mg/dL


 


Est GFR (CKD-EPI)AfAm    >90  (>60 ml/min/1.73 sqM)  


 


Est GFR (CKD-EPI)NonAf    88  (>60 ml/min/1.73 sqM)  


 


Glucose    94  (74-99)  mg/dL


 


Plasma Lactic Acid Pradip     (0.7-2.0)  mmol/L


 


Uric Acid     (3.5-8.5)  mg/dL


 


Calcium    9.8  (8.4-10.2)  mg/dL


 


Total Bilirubin    0.4  (0.2-1.3)  mg/dL


 


AST    26  (17-59)  U/L


 


ALT    34  (4-49)  U/L


 


Alkaline Phosphatase    75  ()  U/L


 


C-Reactive Protein    2.4 H  (<1.0)  mg/dL


 


Total Protein    7.1  (6.3-8.2)  g/dL


 


Albumin    4.4  (3.5-5.0)  g/dL














  01/24/22 01/24/22 Range/Units





  13:28 13:28 


 


WBC    (3.8-10.6)  k/uL


 


RBC    (4.30-5.90)  m/uL


 


Hgb    (13.0-17.5)  gm/dL


 


Hct    (39.0-53.0)  %


 


MCV    (80.0-100.0)  fL


 


MCH    (25.0-35.0)  pg


 


MCHC    (31.0-37.0)  g/dL


 


RDW    (11.5-15.5)  %


 


Plt Count    (150-450)  k/uL


 


MPV    


 


Neutrophils %    %


 


Lymphocytes %    %


 


Monocytes %    %


 


Eosinophils %    %


 


Basophils %    %


 


Neutrophils #    (1.3-7.7)  k/uL


 


Lymphocytes #    (1.0-4.8)  k/uL


 


Monocytes #    (0-1.0)  k/uL


 


Eosinophils #    (0-0.7)  k/uL


 


Basophils #    (0-0.2)  k/uL


 


ESR    (0-15)  mm/hr


 


PT    (9.0-12.0)  sec


 


INR    (<1.2)  


 


APTT    (22.0-30.0)  sec


 


Sodium    (137-145)  mmol/L


 


Potassium    (3.5-5.1)  mmol/L


 


Chloride    ()  mmol/L


 


Carbon Dioxide    (22-30)  mmol/L


 


Anion Gap    mmol/L


 


BUN    (9-20)  mg/dL


 


Creatinine    (0.66-1.25)  mg/dL


 


Est GFR (CKD-EPI)AfAm    (>60 ml/min/1.73 sqM)  


 


Est GFR (CKD-EPI)NonAf    (>60 ml/min/1.73 sqM)  


 


Glucose    (74-99)  mg/dL


 


Plasma Lactic Acid Pradip  1.1   (0.7-2.0)  mmol/L


 


Uric Acid   10.3 H  (3.5-8.5)  mg/dL


 


Calcium    (8.4-10.2)  mg/dL


 


Total Bilirubin    (0.2-1.3)  mg/dL


 


AST    (17-59)  U/L


 


ALT    (4-49)  U/L


 


Alkaline Phosphatase    ()  U/L


 


C-Reactive Protein    (<1.0)  mg/dL


 


Total Protein    (6.3-8.2)  g/dL


 


Albumin    (3.5-5.0)  g/dL














Disposition


Clinical Impression: 


 Prepatellar bursitis, Knee pain





Disposition: HOME SELF-CARE


Condition: Good


Instructions (If sedation given, give patient instructions):  Knee Bursitis (ED)


Additional Instructions: 


Please take antibiotic as directed.  Take and amoxicillin as directed.  Follow-

up with orthopedics by calling today for the earliest appointment.  They want to

see you this week. Return to the emergency room for any worsening symptoms.


Prescriptions: 


Indomethacin [Indocin] 50 mg PO TID PRN #15 capsule


 PRN Reason: Pain


Cephalexin [Keflex] 500 mg PO Q6HR 10 Days #40 cap


Is patient prescribed a controlled substance at d/c from ED?: No


Referrals: 


Supa Mckinney DO [Doctor of Osteopathic Medicine] - 1-2 days


Time of Disposition: 15:10 [[email or web address]] : I, АЛЕКСАНДР Ulloa, attest that I was present throughout this entire visit.  Statement Selected